# Patient Record
Sex: MALE | Race: WHITE | NOT HISPANIC OR LATINO | Employment: FULL TIME | URBAN - METROPOLITAN AREA
[De-identification: names, ages, dates, MRNs, and addresses within clinical notes are randomized per-mention and may not be internally consistent; named-entity substitution may affect disease eponyms.]

---

## 2019-08-23 ENCOUNTER — CONVERSION ENCOUNTER (OUTPATIENT)
Dept: FAMILY MEDICINE CLINIC | Facility: CLINIC | Age: 39
End: 2019-08-23

## 2019-08-23 ENCOUNTER — OFFICE VISIT CONVERTED (OUTPATIENT)
Dept: FAMILY MEDICINE CLINIC | Facility: CLINIC | Age: 39
End: 2019-08-23
Attending: NURSE PRACTITIONER

## 2019-08-23 ENCOUNTER — HOSPITAL ENCOUNTER (OUTPATIENT)
Dept: LAB | Facility: HOSPITAL | Age: 39
Discharge: HOME | End: 2019-08-23
Attending: NURSE PRACTITIONER

## 2019-08-23 LAB
25(OH)D3 SERPL-MCNC: 24.4 NG/ML (ref 30–100)
ALBUMIN SERPL-MCNC: 4.8 G/DL (ref 3.5–5)
ALBUMIN/GLOB SERPL: 1.7 {RATIO} (ref 1.4–2.6)
ALP SERPL-CCNC: 52 U/L (ref 53–128)
ALT SERPL-CCNC: 44 U/L (ref 10–40)
ANION GAP SERPL CALC-SCNC: 16 MMOL/L (ref 8–19)
AST SERPL-CCNC: 26 U/L (ref 15–50)
BASOPHILS # BLD AUTO: 0.09 10*3/UL (ref 0–0.2)
BASOPHILS NFR BLD AUTO: 0.9 % (ref 0–3)
BILIRUB SERPL-MCNC: 0.54 MG/DL (ref 0.2–1.3)
BUN SERPL-MCNC: 16 MG/DL (ref 5–25)
BUN/CREAT SERPL: 15 {RATIO} (ref 6–20)
CALCIUM SERPL-MCNC: 9.3 MG/DL (ref 8.7–10.4)
CHLORIDE SERPL-SCNC: 99 MMOL/L (ref 99–111)
CHOLEST SERPL-MCNC: 192 MG/DL (ref 107–200)
CHOLEST/HDLC SERPL: 4.8 {RATIO} (ref 3–6)
CONV ABS IMM GRAN: 0.1 10*3/UL (ref 0–0.2)
CONV CO2: 27 MMOL/L (ref 22–32)
CONV IMMATURE GRAN: 1 % (ref 0–1.8)
CONV TOTAL PROTEIN: 7.6 G/DL (ref 6.3–8.2)
CREAT UR-MCNC: 1.08 MG/DL (ref 0.7–1.2)
DEPRECATED RDW RBC AUTO: 38.2 FL (ref 35.1–43.9)
EOSINOPHIL # BLD AUTO: 0.23 10*3/UL (ref 0–0.7)
EOSINOPHIL # BLD AUTO: 2.3 % (ref 0–7)
ERYTHROCYTE [DISTWIDTH] IN BLOOD BY AUTOMATED COUNT: 12 % (ref 11.6–14.4)
FOLATE SERPL-MCNC: 18.1 NG/ML (ref 4.8–20)
GFR SERPLBLD BASED ON 1.73 SQ M-ARVRAT: >60 ML/MIN/{1.73_M2}
GLOBULIN UR ELPH-MCNC: 2.8 G/DL (ref 2–3.5)
GLUCOSE SERPL-MCNC: 103 MG/DL (ref 70–99)
HCT VFR BLD AUTO: 50 % (ref 42–52)
HDLC SERPL-MCNC: 40 MG/DL (ref 40–60)
HGB BLD-MCNC: 16.9 G/DL (ref 14–18)
IRON SATN MFR SERPL: 31 % (ref 20–55)
IRON SERPL-MCNC: 119 UG/DL (ref 70–180)
LDLC SERPL CALC-MCNC: 119 MG/DL (ref 70–100)
LYMPHOCYTES # BLD AUTO: 2.73 10*3/UL (ref 1–5)
LYMPHOCYTES NFR BLD AUTO: 27.6 % (ref 20–45)
MCH RBC QN AUTO: 29.4 PG (ref 27–31)
MCHC RBC AUTO-ENTMCNC: 33.8 G/DL (ref 33–37)
MCV RBC AUTO: 87.1 FL (ref 80–96)
MONOCYTES # BLD AUTO: 0.89 10*3/UL (ref 0.2–1.2)
MONOCYTES NFR BLD AUTO: 9 % (ref 3–10)
NEUTROPHILS # BLD AUTO: 5.85 10*3/UL (ref 2–8)
NEUTROPHILS NFR BLD AUTO: 59.2 % (ref 30–85)
NRBC CBCN: 0 % (ref 0–0.7)
OSMOLALITY SERPL CALC.SUM OF ELEC: 287 MOSM/KG (ref 273–304)
PLATELET # BLD AUTO: 248 10*3/UL (ref 130–400)
PMV BLD AUTO: 9.8 FL (ref 9.4–12.4)
POTASSIUM SERPL-SCNC: 4.1 MMOL/L (ref 3.5–5.3)
RBC # BLD AUTO: 5.74 10*6/UL (ref 4.7–6.1)
SODIUM SERPL-SCNC: 138 MMOL/L (ref 135–147)
T4 FREE SERPL-MCNC: 1 NG/DL (ref 0.9–1.8)
TIBC SERPL-MCNC: 389 UG/DL (ref 245–450)
TRANSFERRIN SERPL-MCNC: 272 MG/DL (ref 215–365)
TRIGL SERPL-MCNC: 163 MG/DL (ref 40–150)
TSH SERPL-ACNC: 1.48 M[IU]/L (ref 0.27–4.2)
VIT B12 SERPL-MCNC: 380 PG/ML (ref 211–911)
VLDLC SERPL-MCNC: 33 MG/DL (ref 5–37)
WBC # BLD AUTO: 9.89 10*3/UL (ref 4.8–10.8)

## 2019-09-20 ENCOUNTER — OFFICE VISIT CONVERTED (OUTPATIENT)
Dept: FAMILY MEDICINE CLINIC | Facility: CLINIC | Age: 39
End: 2019-09-20
Attending: NURSE PRACTITIONER

## 2019-10-10 ENCOUNTER — HOSPITAL ENCOUNTER (OUTPATIENT)
Dept: SLEEP MEDICINE | Facility: HOSPITAL | Age: 39
Discharge: HOME | End: 2019-10-10
Attending: PSYCHIATRY & NEUROLOGY

## 2019-10-23 ENCOUNTER — HOSPITAL ENCOUNTER (OUTPATIENT)
Dept: SLEEP MEDICINE | Facility: HOSPITAL | Age: 39
Discharge: HOME | End: 2019-10-23
Attending: PSYCHIATRY & NEUROLOGY

## 2019-12-18 ENCOUNTER — HOSPITAL ENCOUNTER (OUTPATIENT)
Dept: SLEEP MEDICINE | Facility: HOSPITAL | Age: 39
Discharge: HOME | End: 2019-12-18
Attending: PSYCHIATRY & NEUROLOGY

## 2019-12-20 ENCOUNTER — OFFICE VISIT CONVERTED (OUTPATIENT)
Dept: FAMILY MEDICINE CLINIC | Facility: CLINIC | Age: 39
End: 2019-12-20
Attending: NURSE PRACTITIONER

## 2020-01-31 ENCOUNTER — OFFICE VISIT CONVERTED (OUTPATIENT)
Dept: FAMILY MEDICINE CLINIC | Facility: CLINIC | Age: 40
End: 2020-01-31
Attending: NURSE PRACTITIONER

## 2020-02-28 ENCOUNTER — OFFICE VISIT CONVERTED (OUTPATIENT)
Dept: FAMILY MEDICINE CLINIC | Facility: CLINIC | Age: 40
End: 2020-02-28
Attending: NURSE PRACTITIONER

## 2020-07-31 ENCOUNTER — TELEMEDICINE CONVERTED (OUTPATIENT)
Dept: FAMILY MEDICINE CLINIC | Facility: CLINIC | Age: 40
End: 2020-07-31
Attending: NURSE PRACTITIONER

## 2020-09-14 ENCOUNTER — OFFICE VISIT CONVERTED (OUTPATIENT)
Dept: FAMILY MEDICINE CLINIC | Facility: CLINIC | Age: 40
End: 2020-09-14
Attending: PHYSICIAN ASSISTANT

## 2020-09-14 ENCOUNTER — CONVERSION ENCOUNTER (OUTPATIENT)
Dept: FAMILY MEDICINE CLINIC | Facility: CLINIC | Age: 40
End: 2020-09-14

## 2020-12-28 ENCOUNTER — HOSPITAL ENCOUNTER (OUTPATIENT)
Dept: URGENT CARE | Facility: CLINIC | Age: 40
Discharge: HOME | End: 2020-12-28
Attending: EMERGENCY MEDICINE

## 2021-05-13 NOTE — PROGRESS NOTES
Progress Note      Patient Name: Akash North   Patient ID: [id number]   Sex: Male   YOB: 1980    Primary Care Provider: Denia OLMOS   Referring Provider: Denia OLMOS    Visit Date: July 31, 2020    Provider: AMARILIS Rhodes   Location: [facility]   Location Address: [address] [place] [address], [address]  [place], [place]  [id number]   Location Phone: [phone]          Chief Complaint  · 3 month f/u  · ADD      History Of Present Illness  Akash North is a 40 year old /White male who presents for evaluation and treatment of:      the patient presents on facetime for med check and med refill he has h/o ADD he was having some depression and was started on Wellbutrin he states that is better but he is still having some agitation especially with his older children       Past Medical History  Disease Name Date Onset Notes   Pilonidal cyst --  --    Snoring --  --          Past Surgical History  Procedure Name Date Notes   Pilonidal Cyst Excision 2014 --          Medication List  Name Date Started Instructions   vitamin b12 gummies 1000 mcg  takes 2 qd   Vyvanse 40 mg oral capsule 07/30/2020 take 1 capsule (40 mg) by oral route once daily in the morning for 30 days   Wellbutrin  mg oral tablet extended release 24 hr 04/14/2020 take 1 tablet (300 mg) by oral route once daily for 90 days         Allergy List  Allergen Name Date Reaction Notes   NO KNOWN DRUG ALLERGIES --  --  --        Allergies Reconciled  Family Medical History  Disease Name Relative/Age Notes   Heart Disease Aunt/  Grandfather (maternal)/   --    Cancer, Unspecified Grandmother (maternal)/   --          Social History  Finding Status Start/Stop Quantity Notes   Alcohol Current some day 18/0 1-2 drinks/week drinks weekly; liquor   Caffeine Current every day 0/0 --  drinks regularly; coffee and soft drinks; 1-2 times per day    --  --/-- --  --    Tobacco Former 16/36 --  --           Immunizations  NameDate Admin Mfg Trade Name Lot Number Route Inj VIS Given VIS Publication   Temoapwif99/22/2019 [facility] Fluzone Quadrivalent [id number] IM RD 10/22/2019    Comments: tolerated well   Popvqwmhx26/01/2018 NE Not Entered  NE NE     Comments: Pt reported   Tdap08/23/2019 [facility] BOOSTRIX [id number] IM RD 08/23/2019    Comments: tolerated well   Tdap08/23/2019 [facility] BOOSTRIX [id number] IM RD 08/23/2019    Comments: tolerated well   Tdap08/23/2019 [facility] BOOSTRIX [id number] IM RD 08/23/2019    Comments: tolerated well         Review of Systems  · Constitutional  o Denies  o : fever, weight gain, weight loss, malaise/fatigue  · Eyes  o Denies  o : diplopia, recent changes, blurred vision, redness of eye, eye pain, discharge from eye  · HENT  o Denies  o : sore throat, hearing changes, tinnitus, nose bleeding, sinus pain, nasal discharge, ear pain  · Breasts  o Denies  o : lumps, tenderness, swelling, nipple discharge  · Cardiovascular  o Denies  o : palpitation, chest pain, claudication, pedal edema  · Respiratory  o Denies  o : shortness of breath, BYNUM, PND/Orthopnea, hemoptysis, dry cough, productive cough  · Gastrointestinal  o Denies  o : nausea, vomiting, reflux, diarrhea, constipation, abdominal pain, blood in stools  · Genitourinary  o Denies  o : frequency, urgency, dysuria, vaginal discharge, penile discharge, incontinence, nocturia, irregular menses, hot flashes  · Neurologic  o Denies  o : unsteady gait, weakness, dizziness, H/A  · Musculoskeletal  o Denies  o : myalgias, joint pain, joint swelling  · Endocrine  o Denies  o : heat intolerance, cold intolerance, polyuria, polydipsia  · Psychiatric  o Denies  o : suicidal ideation, homicidal ideation, mood changes, hallucinations, memory  · Heme-Lymph  o Denies  o : easy bleeding, easy bruising, edema, lymph node enlargement or tenderness  · Allergic-Immunologic  o Denies  o : bees, frequent illnesses, seasonal allergies      Physical  Examination  · Constitutional  o Appearance  o : well-nourished, well developed, alert, in no acute distress  · Ears, Nose, Mouth and Throat  o Ears  o :   § External Ears  § : appearance within normal limits, no lesions present  § Otoscopic Examination  § : tympanic membrane appearance within normal limits bilaterally without perforations, mobility normal  o Nose  o :   § External Nose  § : normal stucture noted.  § Intranasal Exam  § : no swelling, reddness, turbs normal rachid.  o Oral Cavity  o :   § Oral Mucosa  § : oral mucosa normal without pallor or cyanosis  § Lips  § : lip appearance normal  § Teeth  § : normal dentition for age  § Gums  § : gums pink, non-swollen, no bleeding present  § Tongue  § : tongue appearance normal  § Palate  § : hard palate normal, soft palate appearance normal  o Throat  o :   § Oropharynx  § : no inflammation or lesions present, tonsils within normal limits  · Respiratory  o Respiratory Effort  o : breathing unlabored  o Auscultation of Lungs  o : normal breath sounds throughout  · Cardiovascular  o Heart  o :   § Auscultation of Heart  § : regular rate and rhythm, no murmurs, gallops or rubs  § Palpation of Heart  § : normal apical impulse, no cardiac thrill present  o Peripheral Vascular System  o :   § Carotid Arteries  § : normal pulses bilaterally, no bruits present  § Pedal Pulses  § : pulses 2 bilaterally  § Extremities  § : no cyanosis, clubbing or edema; less than 2 second refill noted  · Gastrointestinal  o Abdominal Examination  o : abdomen nontender to palpation, tone normal without rigidity or guarding, no masses present, abdominal contour scaphoid  o Liver and spleen  o : no hepatomegaly present, liver nontender to palpation, spleen not palpable  · Musculoskeletal  o Right Upper Extremity  o :   § Inspection/Palpation  § : no tenderness to palpation  § Range of Motion  § : range of motion normal, no joint crepitus or pain with motion present  o Left Upper  Extremity  o :   § Inspection/Palpation  § : no tenderness to palpation  § Range of Motion  § : range of motion normal, no joint crepitus present, no pain with joint motion  o Right Lower Extremity  o :   § Inspection/Palpation  § : no joint or limb tenderness to palpation  § Range of Motion  § : range of motion normal, no joint crepitations present, no pain on motion  o Left Lower Extremity  o :   § Inspection/Palpation  § : no joint or limb tenderness to palpation  § Range of Motion  § : range of motion normal, no joint crepitations present, no pain on motion  · Skin and Subcutaneous Tissue  o General Inspection  o : no rashes or lesions present, no areas of discoloration  · Neurologic  o Mental Status Examination  o :   § Orientation  § : grossly oriented to person, place and time  o Cranial Nerves  o : cranial nerves intact and symmetric throughout  · Psychiatric  o Mood and Affect  o : mood normal, affect appropriate, denies any SI/HI          Assessment  · Other long term (current) drug therapy     V58.69/Z79.899  · ADD (attention deficit disorder)     314.00/F98.8      Plan  · Orders  o CODY Report (KASPR) - - 07/31/2020  o ACO-39: Current medications updated and reviewed () - - 07/31/2020  · Medications  o Medications have been Reconciled  o Transition of Care or Provider Policy  · Instructions  o Patient was educated/instructed on their diagnosis, treatment and medications prior to discharge from the clinic today.            Electronically Signed by: admin admin, OT -Author on August 27, 2020 09:26:16 PM

## 2021-05-13 NOTE — PROGRESS NOTES
Progress Note      Patient Name: Akash North   Patient ID: [id number]   Sex: Male   YOB: 1980    Primary Care Provider: Elly BOWER   Referring Provider: Elly BOWER    Visit Date: September 14, 2020    Provider: SATHISH Arenas   Location: [facility]   Location Address: [address] [place] [address], [address]  [place], [place]  [id number]   Location Phone: [phone]          Chief Complaint  · follow up medication refill       History Of Present Illness  Akash North is a 40 year old /White male who presents for evaluation and treatment of:      Patient is here for follow up/medication refills.    ADD: Patient is currently on Vyvanse & Wellbutrin & states he is doing ok but feels like Vyvanse is needing an increase. Patient has been on medication for about 1 year; states he has never seen psychologist for formal testing and diagnosis. Patient appears agitated during appt today on his phone when MA went to bring him back to the room and continued on phone during exam. Last dose of Vyvanse was about 10 days ago.       Past Medical History  Disease Name Date Onset Notes   Pilonidal cyst --  --    Snoring --  --          Past Surgical History  Procedure Name Date Notes   Pilonidal Cyst Excision 2014 --          Medication List  Name Date Started Instructions   vitamin b12 gummies 1000 mcg  takes 2 qd   Vyvanse 40 mg oral capsule 07/30/2020 take 1 capsule (40 mg) by oral route once daily in the morning for 30 days   Wellbutrin  mg oral tablet extended release 24 hr 04/14/2020 take 1 tablet (300 mg) by oral route once daily for 90 days         Allergy List  Allergen Name Date Reaction Notes   NO KNOWN DRUG ALLERGIES --  --  --        Allergies Reconciled  Family Medical History  Disease Name Relative/Age Notes   Heart Disease Aunt/  Grandfather (maternal)/   --    Cancer, Unspecified Grandmother (maternal)/   --          Social  "History  Finding Status Start/Stop Quantity Notes   Alcohol Current some day 18/0 1-2 drinks/week drinks weekly; liquor   Caffeine Current every day 0/0 --  drinks regularly; coffee and soft drinks; 1-2 times per day    --  --/-- --  --    Tobacco Former 16/36 --  --          Immunizations  NameDate Admin Mfg Trade Name Lot Number Route Inj VIS Given VIS Publication   Otzkfbhqn19/22/2019 [facility] Fluzone Quadrivalent [id number] IM RD 10/22/2019    Comments: tolerated well   Jsqsxvzky00/01/2018 NE Not Entered  NE NE     Comments: Pt reported   Tdap08/23/2019 [facility] BOOSTRIX [id number] IM RD 08/23/2019    Comments: tolerated well   Tdap08/23/2019 [facility] BOOST[employer]X [id number] IM RD 08/23/2019    Comments: tolerated well   Tdap08/23/2019 [facility] [employer] [id number] IM RD 08/23/2019    Comments: tolerated well         Review of Systems  · Constitutional  o Denies  o : fever, fatigue, weight loss, weight gain  · Cardiovascular  o Denies  o : lower extremity edema, claudication, chest pressure, palpitations  · Respiratory  o Denies  o : shortness of breath, wheezing, cough, hemoptysis, dyspnea on exertion  · Gastrointestinal  o Denies  o : nausea, vomiting, diarrhea, constipation, abdominal pain      Vitals  Date Time BP Position Site L\R Cuff Size HR RR TEMP (F) WT  HT  BMI kg/m2 BSA m2 O2 Sat        02/28/2020 10:39 /70 Sitting    90 - R   241lbs 16oz 5'  11\" 33.75 2.34 96 %    09/14/2020 05:39 /98 Sitting    100 - R  98.2 246lbs 0oz 6'   33.36 2.38 97 %          Physical Examination  · Constitutional  o Appearance  o : well developed, well-nourished, no acute distress  · Head and Face  o Head  o : normocephalic, atraumatic  · Neck  o Inspection/Palpation  o : normal appearance, no masses or tenderness, trachea midline  o Thyroid  o : gland size normal, nontender, no nodules or masses present on palpation  · Respiratory  o Respiratory Effort  o : breathing unlabored  o Inspection of Chest  o : chest rise symmetric " bilaterally  o Auscultation of Lungs  o : clear to auscultation bilaterally throughout inspiration and expiration  · Cardiovascular  o Heart  o :   § Auscultation of Heart  § : regular rate and rhythm, no murmurs, gallops or rubs  o Peripheral Vascular System  o :   § Extremities  § : no edema  · Lymphatic  o Neck  o : no cervical lymphadenopathy, no supraclavicular lymphadenopathy  · Psychiatric  o Mood and Affect  o : mood normal, affect appropriate          Results  · In-Office Procedures  o Lab procedure  § IOP - Urine Drug Screen In-House [facility] ([id number])   § Amphetamines Ur Ql: Negative   § Barbiturates Ur Ql: Negative   § Buprenorphine+Nor Ur Ql Scn: Negative   § Benzodiaz Ur Ql: Negative   § Cocaine Ur Ql: Negative   § Methadone Ur Ql: Negative   § Methamphet Ur Ql: Negative   § MDMA Ur Ql Scn: Negative   § Opiates Ur Ql: Negative   § Oxycodone Ur Ql: Negative   § PCP Ur Ql: Negative   § THC Ur Ql: Negative   § Temp in Range?: Within/Acceptable   § Control Seen?: Yes       Assessment  · Screening for depression     V79.0/Z13.89  · Depression     311/F32.9  · ADD (attention deficit disorder)     314.00/F98.8       Discussed with patient that I would task Dr. Clark for a refill this month and would set up a referral for psych for formal diagnosis and further medication management. Patient was agitated and left but seemed to be in agreement.     Problems Reconciled  Plan  · Orders  o ACO-18: Negative screen for clinical depression using a standardized tool () - V79.0/Z13.89 - 09/14/2020  o CODY Report (KASPR) - - 09/14/2020  o ACO-39: Current medications updated and reviewed () - - 09/14/2020  o PSYCHIATRY CONSULTATION (PSYCH) - 314.00/F98.8 - 09/14/2020   continued medication management.  · Medications  o Medications have been Reconciled  o Transition of Care or Provider Policy  · Instructions  o Depression Screen completed and scanned into the EMR under the designated folder within the patient's  documents.  o Today's PHQ-9 result is 3  o Patient was educated/instructed on their diagnosis, treatment and medications prior to discharge from the clinic today.  o Call the office with any concerns or questions.  o Electronically Identified Patient Education Materials Provided Electronically  · Disposition  o Follow Up PRN.  · Associate Tasks  o Task ID [id number] ''''Provider to Provider ONLY Controlled Substance Request: Vyvanse refill            Electronically Signed by: SATHISH Arenas -Author on September 14, 2020 05:48:21 PM

## 2021-05-14 VITALS
DIASTOLIC BLOOD PRESSURE: 98 MMHG | WEIGHT: 246 LBS | OXYGEN SATURATION: 97 % | HEIGHT: 72 IN | SYSTOLIC BLOOD PRESSURE: 163 MMHG | HEART RATE: 100 BPM | BODY MASS INDEX: 33.32 KG/M2 | TEMPERATURE: 98.2 F

## 2021-05-15 VITALS
WEIGHT: 233 LBS | BODY MASS INDEX: 32.62 KG/M2 | DIASTOLIC BLOOD PRESSURE: 80 MMHG | SYSTOLIC BLOOD PRESSURE: 124 MMHG | HEART RATE: 100 BPM | HEIGHT: 71 IN | OXYGEN SATURATION: 97 %

## 2021-05-15 VITALS
OXYGEN SATURATION: 98 % | SYSTOLIC BLOOD PRESSURE: 140 MMHG | HEIGHT: 71 IN | DIASTOLIC BLOOD PRESSURE: 90 MMHG | WEIGHT: 239 LBS | BODY MASS INDEX: 33.46 KG/M2

## 2021-05-15 VITALS
DIASTOLIC BLOOD PRESSURE: 80 MMHG | OXYGEN SATURATION: 98 % | HEIGHT: 71 IN | WEIGHT: 234 LBS | SYSTOLIC BLOOD PRESSURE: 130 MMHG | BODY MASS INDEX: 32.76 KG/M2 | HEART RATE: 91 BPM

## 2021-05-15 VITALS
WEIGHT: 245.5 LBS | HEART RATE: 86 BPM | HEIGHT: 71 IN | OXYGEN SATURATION: 95 % | SYSTOLIC BLOOD PRESSURE: 132 MMHG | BODY MASS INDEX: 34.37 KG/M2 | DIASTOLIC BLOOD PRESSURE: 89 MMHG

## 2021-05-15 VITALS
WEIGHT: 242 LBS | HEIGHT: 71 IN | DIASTOLIC BLOOD PRESSURE: 70 MMHG | BODY MASS INDEX: 33.88 KG/M2 | SYSTOLIC BLOOD PRESSURE: 120 MMHG | HEART RATE: 90 BPM | OXYGEN SATURATION: 96 %

## 2021-07-23 RX ORDER — UBIDECARENONE 75 MG
CAPSULE ORAL
COMMUNITY
End: 2022-05-26

## 2021-07-26 ENCOUNTER — OFFICE VISIT (OUTPATIENT)
Dept: SURGERY | Facility: CLINIC | Age: 41
End: 2021-07-26

## 2021-07-26 VITALS — HEIGHT: 72 IN | BODY MASS INDEX: 34.67 KG/M2 | RESPIRATION RATE: 14 BRPM | WEIGHT: 256 LBS

## 2021-07-26 DIAGNOSIS — L98.8 PILONIDAL DISEASE: Primary | ICD-10-CM

## 2021-07-26 PROCEDURE — 99202 OFFICE O/P NEW SF 15 MIN: CPT | Performed by: SURGERY

## 2021-07-26 RX ORDER — CLINDAMYCIN PHOSPHATE 10 MG/1
SWAB TOPICAL
COMMUNITY
Start: 2021-04-30

## 2021-07-26 RX ORDER — FLUOXETINE HYDROCHLORIDE 20 MG/1
20 CAPSULE ORAL DAILY
COMMUNITY
Start: 2021-07-22

## 2021-07-26 RX ORDER — LISDEXAMFETAMINE DIMESYLATE 50 MG
50 CAPSULE ORAL DAILY
COMMUNITY
Start: 2021-07-24

## 2021-07-26 NOTE — PROGRESS NOTES
"Chief Complaint:  Cyst (pilonidal cyst )         History of Present Illness  Akash North is a 41 y.o. male referred by SATHISH Mendoza chronic pilonidal issues.  Patient says he has had problems on and off near the top of his midline gluteal cleft for many years.  He has had incision and drainage performed twice.  He has some mild pain at the area now.  He recently was seen [facility] by Jennifer Leger and was prescribed topical clindamycin.  No fevers.  No drainage from the area at this time.  Patient does not smoke cigarettes but he does do vaping.    Allergies: Patient has no known allergies.    Current Outpatient Medications:   •  Clindacin-P 1 % swab, APPLY TOPCIALLY TWICE DAILY, Disp: , Rfl:   •  FLUoxetine (PROzac) 20 MG capsule, Take 20 mg by mouth Daily., Disp: , Rfl:   •  Vyvanse 50 MG capsule, Take 50 mg by mouth Daily, Disp: , Rfl:   •  vitamin B-12 (CYANOCOBALAMIN) 100 MCG tablet, vitamin b12 gummies 1000 mcg takes 2 qd   Active, Disp: , Rfl:     Past Medical History:   • Pilonidal cyst   • Snoring     Past Surgical History:   • PILONIDAL CYST / SINUS EXCISION     Family History:   Family History   Problem Relation Age of Onset   • Cancer Maternal Grandmother    • Heart disease Maternal Grandfather    • Heart disease Other    • Diabetes Other       Social History:  Social History     Tobacco Use   • Smoking status: Former Smoker   • Smokeless tobacco: Never Used   • Tobacco comment: 16/36   Substance Use Topics   • Alcohol use: Yes     Comment: drinks weekly; liquor       Objective     Vital Signs:  Resp 14   Ht 182.9 cm (72\")   Wt 116 kg (256 lb)   BMI 34.72 kg/m²   • Constitutional: healthy appearing, alert, no acute distress, reliable historian, here alone  • HENT:  NCAT, no visible deformities or lesions, wearing glasses  • Eyes:  sclerae clear, conjunctivae clear, EOMI  • Neck:  normal appearance, no masses, trachea midline  • Respiratory:  breathing not labored, " respiratory effort appears normal  • Cardiovascular:  heart regular rate and rhythm  • Abdomen:  soft, nontender, nondistended    • Skin and subcutaneous tissue: At the top of the midline gluteal cleft just to the right side, there is an area about 5 cm in diameter that is indurated and has an appearance and exam consistent with scar tissue.  There is mild redness of the area.  No drainage.  There is a moderate amount of hair present on the buttock skin.  In the midline of the gluteal cleft there are at least 4 5 moderate sized skin pits.    • Neurologic:  no obvious motor or sensory deficits, normal gait, able to stand without difficulty, cerebellar function without any obvious abnormalities, alert & oriented x 3, speech clear  • Psychiatric:  judgment and insight intact, mood normal, affect appropriate, cooperative    Assessment:  Pilonidal disease    Plan:  No acute issue present on exam that needs addressed today.  Discussed the diagnosis of pilonidal disease.  Also discussed treatment option of cleft lift procedure.  I also recommended the patient use the internet to review information about pilonidal disease and also to watch videos about the procedure I mentioned today.  Patient will then schedule an appointment to see me again if the patient wants to proceed with surgical intervention.    Magdaleno Jovel MD  07/26/2021    Electronically signed by Magdaleno Jovel MD, 07/26/21, 10:44 AM EDT.

## 2022-05-09 ENCOUNTER — APPOINTMENT (OUTPATIENT)
Dept: GENERAL RADIOLOGY | Facility: HOSPITAL | Age: 42
End: 2022-05-09

## 2022-05-09 ENCOUNTER — HOSPITAL ENCOUNTER (EMERGENCY)
Facility: HOSPITAL | Age: 42
Discharge: HOME | End: 2022-05-09
Attending: EMERGENCY MEDICINE | Admitting: EMERGENCY MEDICINE

## 2022-05-09 VITALS
WEIGHT: 251.1 LBS | HEIGHT: 72 IN | SYSTOLIC BLOOD PRESSURE: 127 MMHG | BODY MASS INDEX: 34.01 KG/M2 | HEART RATE: 96 BPM | RESPIRATION RATE: 18 BRPM | TEMPERATURE: 98 F | DIASTOLIC BLOOD PRESSURE: 93 MMHG | OXYGEN SATURATION: 98 %

## 2022-05-09 DIAGNOSIS — M25.561 PAIN AND SWELLING OF RIGHT KNEE: Primary | ICD-10-CM

## 2022-05-09 DIAGNOSIS — S80.211A ABRASION OF RIGHT KNEE, INITIAL ENCOUNTER: ICD-10-CM

## 2022-05-09 DIAGNOSIS — M25.461 PAIN AND SWELLING OF RIGHT KNEE: Primary | ICD-10-CM

## 2022-05-09 PROCEDURE — 99282 EMERGENCY DEPT VISIT SF MDM: CPT

## 2022-05-09 PROCEDURE — 25010000002 DEXAMETHASONE PER 1 MG

## 2022-05-09 PROCEDURE — 25010000002 KETOROLAC TROMETHAMINE PER 15 MG

## 2022-05-09 PROCEDURE — 96372 THER/PROPH/DIAG INJ SC/IM: CPT

## 2022-05-09 PROCEDURE — 73562 X-RAY EXAM OF KNEE 3: CPT

## 2022-05-09 RX ORDER — PREDNISONE 20 MG/1
TABLET ORAL
Qty: 18 TABLET | Refills: 0 | Status: SHIPPED | OUTPATIENT
Start: 2022-05-09 | End: 2022-05-18

## 2022-05-09 RX ORDER — KETOROLAC TROMETHAMINE 10 MG/1
10 TABLET, FILM COATED ORAL EVERY 6 HOURS PRN
Qty: 20 TABLET | Refills: 0 | Status: SHIPPED | OUTPATIENT
Start: 2022-05-09

## 2022-05-09 RX ORDER — LISINOPRIL AND HYDROCHLOROTHIAZIDE 12.5; 1 MG/1; MG/1
12.5 TABLET ORAL DAILY
COMMUNITY

## 2022-05-09 RX ORDER — KETOROLAC TROMETHAMINE 30 MG/ML
60 INJECTION, SOLUTION INTRAMUSCULAR; INTRAVENOUS ONCE
Status: COMPLETED | OUTPATIENT
Start: 2022-05-09 | End: 2022-05-09

## 2022-05-09 RX ORDER — DEXAMETHASONE SODIUM PHOSPHATE 10 MG/ML
10 INJECTION INTRAMUSCULAR; INTRAVENOUS ONCE
Status: COMPLETED | OUTPATIENT
Start: 2022-05-09 | End: 2022-05-09

## 2022-05-09 RX ADMIN — DEXAMETHASONE SODIUM PHOSPHATE 10 MG: 10 INJECTION INTRAMUSCULAR; INTRAVENOUS at 07:40

## 2022-05-09 RX ADMIN — KETOROLAC TROMETHAMINE 60 MG: 60 INJECTION, SOLUTION INTRAMUSCULAR at 07:37

## 2022-05-09 NOTE — ED PROVIDER NOTES
Subjective   Patient is a 42-year-old male that presents to the emergency department today, via POV, complaining of right knee pain.  Patient states that Saturday he was doing some yard work when he fell and hit a rock.  He states since then the pain has increased as well as edema.  He describes his pain at rest as throbbing and reports that it increases to a 9 on a scale of 0-10 when he attempts to move it.  He has tried compression to the knee to help with discomfort however it has not helped.      History provided by:  Patient   used: No        Review of Systems   Constitutional: Negative for chills and fever.   HENT: Negative for congestion, ear pain and sore throat.    Eyes: Negative for pain.   Respiratory: Negative for cough, chest tightness and shortness of breath.    Cardiovascular: Negative for chest pain.   Gastrointestinal: Negative for abdominal pain, diarrhea, nausea and vomiting.   Genitourinary: Negative for flank pain and hematuria.   Musculoskeletal: Positive for joint swelling.        Right knee pain and tenderness   Skin: Negative for pallor.   Neurological: Negative for seizures and headaches.   All other systems reviewed and are negative.      Past Medical History:   Diagnosis Date   • Depression    • Hypertension    • Pilonidal cyst    • Snoring        No Known Allergies    Past Surgical History:   Procedure Laterality Date   • PILONIDAL CYST / SINUS EXCISION  2014       Family History   Problem Relation Age of Onset   • Cancer Maternal Grandmother    • Heart disease Maternal Grandfather    • Heart disease Other    • Diabetes Other        Social History     Socioeconomic History   • Marital status:    Tobacco Use   • Smoking status: Former Smoker   • Smokeless tobacco: Never Used   • Tobacco comment: 16/36   Vaping Use   • Vaping Use: Every day   • Substances: Nicotine   • Devices: Pre-filled or refillable cartridge   Substance and Sexual Activity   • Alcohol use:  Yes     Comment: drinks weekly; liquor   • Drug use: Never   • Sexual activity: Defer           Objective   Physical Exam  Vitals and nursing note reviewed.   Constitutional:       General: He is not in acute distress.     Appearance: Normal appearance. He is not ill-appearing or toxic-appearing.   HENT:      Head: Normocephalic and atraumatic.      Mouth/Throat:      Mouth: Mucous membranes are moist.   Eyes:      General: No scleral icterus.  Cardiovascular:      Rate and Rhythm: Normal rate and regular rhythm.      Pulses: Normal pulses.      Heart sounds: Normal heart sounds.   Pulmonary:      Effort: Pulmonary effort is normal. No respiratory distress.      Breath sounds: Normal breath sounds.   Abdominal:      General: Abdomen is flat.      Palpations: Abdomen is soft.      Tenderness: There is no abdominal tenderness.   Musculoskeletal:         General: Swelling and tenderness present. No deformity. Normal range of motion.      Cervical back: Normal range of motion and neck supple.        Legs:       Comments: Right knee is edematous in nature, tender on exam, with a abrasion located in center of knee.  No gross deformity, no obvious dislocation, PMS intact, and cap refill within normal limits.  Range of motion slightly limited.There is no warmth or redness.    Skin:     General: Skin is warm and dry.      Coloration: Skin is not jaundiced or pale.      Findings: No bruising, erythema, lesion or rash.   Neurological:      Mental Status: He is alert and oriented to person, place, and time. Mental status is at baseline.         Procedures           ED Course  ED Course as of 05/09/22 0741   Mon May 09, 2022   0719 Patient does report he has an established relationship with the pediatric MD Dr. Jones. [MS]      ED Course User Index  [MS] Mireille Fernández, APRN                                                 STEFAN  Number of Diagnoses or Management Options  Abrasion of right knee, initial encounter: new and  does not require workup  Pain and swelling of right knee: new and does not require workup  Diagnosis management comments: I have spoke with the patient and I have explained the patient´s condition, diagnoses and treatment plan based on the information available to me at this time. I have answered all questions and addressed any concerns. The patient has a good understanding of the patient´s diagnosis, condition, and treatment plan as can be expected at this point. The vital signs have been stable. The patient´s condition is stable and appropriate for discharge from the emergency department.      The patient will pursue further outpatient evaluation with the primary care physician or other designated or consulting physician as outlined in the discharge instructions. They are agreeable to this plan of care and follow-up instructions have been explained in detail. The patient has received these instructions in written format and have expressed an understanding of the discharge instructions. The patient is aware that any significant change in condition or worsening of symptoms should prompt an immediate return to this or the closest emergency department or call to 911.         Amount and/or Complexity of Data Reviewed  Tests in the radiology section of CPT®: reviewed and ordered  Review and summarize past medical records: yes (I have personally reviewed patient's previous medical encounters.  )    Risk of Complications, Morbidity, and/or Mortality  Presenting problems: low  Diagnostic procedures: low  Management options: low    Patient Progress  Patient progress: stable      Final diagnoses:   Pain and swelling of right knee   Abrasion of right knee, initial encounter       ED Disposition  ED Disposition     ED Disposition   Discharge    Condition   Stable    Comment   --             Mitchel Jones MD  [address] [facility]  [place] [address]  [phone]      Call today to schedule a follow-up appointment with your  ortho doctor Dr. Jones.         Medication List      New Prescriptions    ketorolac 10 MG tablet  Commonly known as: TORADOL  Take 1 tablet by mouth Every 6 (Six) Hours As Needed for Moderate Pain .     predniSONE 20 MG tablet  Commonly known as: DELTASONE  Take 1 tablet by mouth 3 (Three) Times a Day With Meals for 3 days, THEN 1 tablet 2 (Two) Times a Day for 3 days, THEN 1 tablet Daily for 3 days.  Start taking on: May 9, 2022           Where to Get Your Medications      These medications were sent to [id number] - [place], [place] - [address]  AT [facility] [address] - [phone]  - [phone]   [address] [facility] [address]    Phone: [phone]   · ketorolac 10 MG tablet  · predniSONE 20 MG tablet          Mireille Fernández, AMARILIS  05/09/22 0733       Mireille Fernández, AMARILIS  05/09/22 0700

## 2022-05-12 ENCOUNTER — OFFICE VISIT (OUTPATIENT)
Dept: ORTHOPEDIC SURGERY | Facility: CLINIC | Age: 42
End: 2022-05-12

## 2022-05-12 VITALS — HEIGHT: 72 IN | WEIGHT: 253.2 LBS | BODY MASS INDEX: 34.29 KG/M2 | HEART RATE: 102 BPM | OXYGEN SATURATION: 95 %

## 2022-05-12 DIAGNOSIS — S89.91XA RIGHT KNEE INJURY, INITIAL ENCOUNTER: Primary | ICD-10-CM

## 2022-05-12 PROCEDURE — 99203 OFFICE O/P NEW LOW 30 MIN: CPT | Performed by: ORTHOPAEDIC SURGERY

## 2022-05-12 NOTE — PROGRESS NOTES
"Chief Complaint  Pain and Initial Evaluation of the Right Knee     Subjective      Akash North presents to [facility] for evaluation of the right knee. The patient reports he fell on Saturday 5/7/22 and landed on his knee. He reports he had instant swelling and pain to the knee. He has had stiffness to the knee. He has rested the knee without relief. He was seen and evaluated with x-rays that were negative. He has no other complaints.     No Known Allergies     Social History     Socioeconomic History   • Marital status:    Tobacco Use   • Smoking status: Former Smoker   • Smokeless tobacco: Never Used   • Tobacco comment: 16/36   Vaping Use   • Vaping Use: Every day   • Substances: Nicotine   • Devices: Pre-filled or refillable cartridge   Substance and Sexual Activity   • Alcohol use: Yes     Comment: drinks weekly; liquor   • Drug use: Never   • Sexual activity: Defer        Review of Systems     Objective   Vital Signs:   Pulse 102   Ht 182.9 cm (72\")   Wt 115 kg (253 lb 3.2 oz)   SpO2 95%   BMI 34.34 kg/m²       Physical Exam  Constitutional:       Appearance: Normal appearance. The patient is well-developed and normal weight.   HENT:      Head: Normocephalic.      Right Ear: Hearing and external ear normal.      Left Ear: Hearing and external ear normal.      Nose: Nose normal.   Eyes:      Conjunctiva/sclera: Conjunctivae normal.   Cardiovascular:      Rate and Rhythm: Normal rate.   Pulmonary:      Effort: Pulmonary effort is normal.      Breath sounds: No wheezing or rales.   Abdominal:      Palpations: Abdomen is soft.      Tenderness: There is no abdominal tenderness.   Musculoskeletal:      Cervical back: Normal range of motion.   Skin:     Findings: No rash.   Neurological:      Mental Status: The patient is alert and oriented to person, place, and time.   Psychiatric:         Mood and Affect: Mood and affect normal.         Judgment: Judgment normal. "       Ortho Exam      Right knee- abrasion to the anterior knee with mild bruising and swelling. Knee Extensor Mechanism intact. ROM 0- 115 degrees. Pain with flexion. 5/5 extension strength. Stable to varus/valgus stress. Stable to anterior/posterior drawer. Positive EHL, FHL, GS and TA. Sensation intact to all 5 nerves of the foot. Positive pulses.     Procedures      Imaging Results (Most Recent)     None           Result Review :       XR Knee 3 View Right    Result Date: 5/9/2022  Narrative: PROCEDURE: XR KNEE 3 VW RIGHT  COMPARISON: None  INDICATIONS: RIGHT ANTERIOR KNEE ABRASION AFTER FALL INJURY YESTERDAY  FINDINGS:  BONES: Normal.  No significant arthropathy or acute abnormality.  SOFT TISSUES: Negative.  No visible soft tissue swelling.  EFFUSION: None visible.  OTHER: Negative.       Impression:  Normal examination.       MAGDALENA LANDA MD       Electronically Signed and Approved By: MAGDALENA LANDA MD on 5/09/2022 at 7:00                      Assessment and Plan     Diagnoses and all orders for this visit:    1. Right knee injury, initial encounter (Primary)        Discussed the treatment plan with the patient. I reviewed the x-rays that were obtained. I believe he just has a contusion to the right knee.  Plan to continue to rest the knee and observe it. May consider MRI of the right knee if symptoms worsen. Plan for OTC medication for pain and swelling.     Call or return if worsening symptoms.    Follow Up     PRN      Patient was given instructions and counseling regarding his condition or for health maintenance advice. Please see specific information pulled into the AVS if appropriate.     Scribed for Edy Olivia MD by Heather Salas.  05/12/22   09:07 EDT    I have personally performed the services described in this document as scribed by the above individual and it is both accurate and complete. Edy Olivia MD 05/12/22

## 2022-05-26 ENCOUNTER — OFFICE VISIT (OUTPATIENT)
Dept: UROLOGY | Facility: CLINIC | Age: 42
End: 2022-05-26

## 2022-05-26 VITALS
WEIGHT: 248 LBS | HEIGHT: 72 IN | TEMPERATURE: 98.4 F | HEART RATE: 101 BPM | BODY MASS INDEX: 33.59 KG/M2 | DIASTOLIC BLOOD PRESSURE: 93 MMHG | SYSTOLIC BLOOD PRESSURE: 148 MMHG

## 2022-05-26 DIAGNOSIS — Z30.09 STERILIZATION CONSULT: Primary | ICD-10-CM

## 2022-05-26 LAB
BILIRUB BLD-MCNC: NEGATIVE MG/DL
CLARITY, POC: CLEAR
COLOR UR: YELLOW
GLUCOSE UR STRIP-MCNC: NEGATIVE MG/DL
KETONES UR QL: NEGATIVE
LEUKOCYTE EST, POC: NEGATIVE
NITRITE UR-MCNC: NEGATIVE MG/ML
PH UR: 5.5 [PH] (ref 5–8)
PROT UR STRIP-MCNC: ABNORMAL MG/DL
RBC # UR STRIP: NEGATIVE /UL
SP GR UR: 1.03 (ref 1–1.03)
UROBILINOGEN UR QL: NORMAL

## 2022-05-26 PROCEDURE — 81002 URINALYSIS NONAUTO W/O SCOPE: CPT | Performed by: NURSE PRACTITIONER

## 2022-05-26 PROCEDURE — 99212 OFFICE O/P EST SF 10 MIN: CPT | Performed by: NURSE PRACTITIONER

## 2022-05-26 NOTE — PROGRESS NOTES
"Chief Complaint  Sterilization    Subjective          Akash North 42 y.o. male presents  to [facility]  History of Present Illness  The patient is a consultation from self, for vasectomy counseling. Prior  surgeries have not been performed. Patient is   and has  2 biological children. He and his spouse have 5 children in the home.They do not wish to have anymore children.    The patient is counseled regarding a no scalpel vasectomy. Key points are that it should be considered irreversible even though it can be reversed, there are risks including failure to achieve sterility, recanalization, bleeding, testicular swelling and/or pain, formation of a sperm granuloma and infection. Limitations of activity post-procedure were discussed and he understands that he is not sterile immediately following the procedure. He will need to bring a semen specimen back in about 6-8 weeks to confirm the abscence of sperm and needs to use contraception until then. Patient understands this is considered an irreversible procedure and wishes have performed. Denies any urological problems or bleeding disorders.            Review of Systems   Constitutional: Negative for chills and fever.   Respiratory: Negative.    Genitourinary: Negative.    Hematological: Does not bruise/bleed easily.      Objective   Vital Signs:   /93   Pulse 101   Temp 98.4 °F (36.9 °C)   Ht 182.9 cm (72\")   Wt 112 kg (248 lb)   BMI 33.63 kg/m²     Physical Exam  Vitals and nursing note reviewed.   Constitutional:       General: He is not in acute distress.     Appearance: Normal appearance. He is well-developed. He is not ill-appearing.      Comments: Ambulates without difficulty   Cardiovascular:      Rate and Rhythm: Normal rate and regular rhythm.   Pulmonary:      Effort: Pulmonary effort is normal.      Breath sounds: Normal breath sounds and air entry.   Genitourinary:     Penis: Circumcised.       Testes: " Normal.         Right: Tenderness not present.         Left: Tenderness not present.      Epididymis:      Right: Normal. No tenderness.      Left: No tenderness.   Musculoskeletal:         General: Normal range of motion.   Skin:     General: Skin is warm and dry.   Neurological:      Mental Status: He is alert and oriented to person, place, and time.      Motor: Motor function is intact.   Psychiatric:         Mood and Affect: Mood normal.         Behavior: Behavior normal. Behavior is cooperative.         Thought Content: Thought content normal.         Judgment: Judgment normal.        Result Review :        POC Urinalysis Dipstick (05/26/2022 09:03)              Assessment and Plan    Diagnoses and all orders for this visit:    1. Sterilization consult (Primary)  -     POC Urinalysis Dipstick    Patient instructed to contact office several days prior to schedule procedure if requesting valium 5 mg x1. Controlled medication will then be sent to his pharmacy. No driving on medication.    Follow Up   Return for vasectomy when scheduled.  Patient was given instructions and counseling regarding his condition or for health maintenance advice. Please see specific information pulled into the AVS if appropriate. Instructions    • The patient is to go immediately home and lie down flat on his back with an ice pack on the scrotum. He may shower in the morning but no immersion in water for 4 days. He is to avoid strenuous activity for 3 days and straddle activity for 3 weeks. He is reminded that he is not yet sterile and must use contraception until we confirm he no longer has sperm in a semen specimen to be brought to the office in 6 weeks. He is to call for problems.  • The patient will schedule a vasectomy if he desires  to proceed.  • Handouts were provided.  • Electronically Identified Patient Education Materials provided.    All risks, benefits and alternatives to vasectomy discussed and understood. Understanding  that this is considered an irreversible procedure. Written consent was obtained. Verbal and written instructions and information were provided. Schedule procedure when patient desires.    Mojgan Thao, APRN

## 2022-05-27 ENCOUNTER — PROCEDURE VISIT (OUTPATIENT)
Dept: UROLOGY | Facility: CLINIC | Age: 42
End: 2022-05-27

## 2022-05-27 DIAGNOSIS — Z30.2 STERILIZATION: Primary | ICD-10-CM

## 2022-05-27 DIAGNOSIS — Z30.09 STERILIZATION CONSULT: ICD-10-CM

## 2022-05-27 PROCEDURE — 55250 REMOVAL OF SPERM DUCT(S): CPT | Performed by: UROLOGY

## 2022-07-19 ENCOUNTER — LAB (OUTPATIENT)
Dept: UROLOGY | Facility: CLINIC | Age: 42
End: 2022-07-19

## 2022-07-19 DIAGNOSIS — Z30.2 STERILIZATION: Primary | ICD-10-CM

## 2022-08-02 ENCOUNTER — LAB (OUTPATIENT)
Dept: UROLOGY | Facility: CLINIC | Age: 42
End: 2022-08-02

## 2022-08-02 DIAGNOSIS — Z30.2 STERILIZATION: Primary | ICD-10-CM

## 2024-09-21 ENCOUNTER — APPOINTMENT (OUTPATIENT)
Dept: CT IMAGING | Facility: HOSPITAL | Age: 44
End: 2024-09-21
Payer: COMMERCIAL

## 2024-09-21 ENCOUNTER — HOSPITAL ENCOUNTER (EMERGENCY)
Facility: HOSPITAL | Age: 44
Discharge: HOME | End: 2024-09-21
Attending: EMERGENCY MEDICINE
Payer: COMMERCIAL

## 2024-09-21 VITALS
SYSTOLIC BLOOD PRESSURE: 139 MMHG | HEART RATE: 99 BPM | BODY MASS INDEX: 33.95 KG/M2 | WEIGHT: 250.66 LBS | HEIGHT: 72 IN | DIASTOLIC BLOOD PRESSURE: 82 MMHG | RESPIRATION RATE: 20 BRPM | OXYGEN SATURATION: 100 % | TEMPERATURE: 98.1 F

## 2024-09-21 DIAGNOSIS — L05.91 PILONIDAL CYST: ICD-10-CM

## 2024-09-21 DIAGNOSIS — K62.89 RECTAL PAIN: Primary | ICD-10-CM

## 2024-09-21 LAB
ALBUMIN SERPL-MCNC: 4.3 G/DL (ref 3.5–5.2)
ALBUMIN/GLOB SERPL: 1.3 G/DL
ALP SERPL-CCNC: 60 U/L (ref 39–117)
ALT SERPL W P-5'-P-CCNC: 50 U/L (ref 1–41)
ANION GAP SERPL CALCULATED.3IONS-SCNC: 12.3 MMOL/L (ref 5–15)
AST SERPL-CCNC: 38 U/L (ref 1–40)
BASOPHILS # BLD AUTO: 0.08 10*3/MM3 (ref 0–0.2)
BASOPHILS NFR BLD AUTO: 0.6 % (ref 0–1.5)
BILIRUB SERPL-MCNC: 0.6 MG/DL (ref 0–1.2)
BUN SERPL-MCNC: 11 MG/DL (ref 6–20)
BUN/CREAT SERPL: 9.2 (ref 7–25)
CALCIUM SPEC-SCNC: 9.4 MG/DL (ref 8.6–10.5)
CHLORIDE SERPL-SCNC: 103 MMOL/L (ref 98–107)
CO2 SERPL-SCNC: 23.7 MMOL/L (ref 22–29)
CREAT SERPL-MCNC: 1.2 MG/DL (ref 0.76–1.27)
DEPRECATED RDW RBC AUTO: 39.9 FL (ref 37–54)
EGFRCR SERPLBLD CKD-EPI 2021: 77.4 ML/MIN/1.73
EOSINOPHIL # BLD AUTO: 0.16 10*3/MM3 (ref 0–0.4)
EOSINOPHIL NFR BLD AUTO: 1.2 % (ref 0.3–6.2)
ERYTHROCYTE [DISTWIDTH] IN BLOOD BY AUTOMATED COUNT: 12.3 % (ref 12.3–15.4)
GLOBULIN UR ELPH-MCNC: 3.2 GM/DL
GLUCOSE SERPL-MCNC: 133 MG/DL (ref 65–99)
HCT VFR BLD AUTO: 44.4 % (ref 37.5–51)
HGB BLD-MCNC: 15.8 G/DL (ref 13–17.7)
HOLD SPECIMEN: NORMAL
IMM GRANULOCYTES # BLD AUTO: 0.12 10*3/MM3 (ref 0–0.05)
IMM GRANULOCYTES NFR BLD AUTO: 0.9 % (ref 0–0.5)
LYMPHOCYTES # BLD AUTO: 2.65 10*3/MM3 (ref 0.7–3.1)
LYMPHOCYTES NFR BLD AUTO: 20.4 % (ref 19.6–45.3)
MCH RBC QN AUTO: 31.5 PG (ref 26.6–33)
MCHC RBC AUTO-ENTMCNC: 35.6 G/DL (ref 31.5–35.7)
MCV RBC AUTO: 88.6 FL (ref 79–97)
MONOCYTES # BLD AUTO: 0.97 10*3/MM3 (ref 0.1–0.9)
MONOCYTES NFR BLD AUTO: 7.5 % (ref 5–12)
NEUTROPHILS NFR BLD AUTO: 69.4 % (ref 42.7–76)
NEUTROPHILS NFR BLD AUTO: 8.98 10*3/MM3 (ref 1.7–7)
NRBC BLD AUTO-RTO: 0 /100 WBC (ref 0–0.2)
PLATELET # BLD AUTO: 246 10*3/MM3 (ref 140–450)
PMV BLD AUTO: 9.4 FL (ref 6–12)
POTASSIUM SERPL-SCNC: 4.3 MMOL/L (ref 3.5–5.2)
PROT SERPL-MCNC: 7.5 G/DL (ref 6–8.5)
RBC # BLD AUTO: 5.01 10*6/MM3 (ref 4.14–5.8)
SODIUM SERPL-SCNC: 139 MMOL/L (ref 136–145)
WBC NRBC COR # BLD AUTO: 12.96 10*3/MM3 (ref 3.4–10.8)
WHOLE BLOOD HOLD COAG: NORMAL

## 2024-09-21 PROCEDURE — 25010000002 MORPHINE PER 10 MG: Performed by: EMERGENCY MEDICINE

## 2024-09-21 PROCEDURE — 85025 COMPLETE CBC W/AUTO DIFF WBC: CPT | Performed by: EMERGENCY MEDICINE

## 2024-09-21 PROCEDURE — 96361 HYDRATE IV INFUSION ADD-ON: CPT

## 2024-09-21 PROCEDURE — 25010000002 KETOROLAC TROMETHAMINE PER 15 MG: Performed by: EMERGENCY MEDICINE

## 2024-09-21 PROCEDURE — 96374 THER/PROPH/DIAG INJ IV PUSH: CPT

## 2024-09-21 PROCEDURE — 80053 COMPREHEN METABOLIC PANEL: CPT | Performed by: EMERGENCY MEDICINE

## 2024-09-21 PROCEDURE — 99285 EMERGENCY DEPT VISIT HI MDM: CPT

## 2024-09-21 PROCEDURE — 74177 CT ABD & PELVIS W/CONTRAST: CPT

## 2024-09-21 PROCEDURE — 25810000003 SODIUM CHLORIDE 0.9 % SOLUTION: Performed by: EMERGENCY MEDICINE

## 2024-09-21 PROCEDURE — 96375 TX/PRO/DX INJ NEW DRUG ADDON: CPT

## 2024-09-21 PROCEDURE — 25510000001 IOPAMIDOL PER 1 ML: Performed by: EMERGENCY MEDICINE

## 2024-09-21 RX ORDER — DESVENLAFAXINE 50 MG/1
50 TABLET, FILM COATED, EXTENDED RELEASE ORAL DAILY
COMMUNITY

## 2024-09-21 RX ORDER — KETOROLAC TROMETHAMINE 15 MG/ML
15 INJECTION, SOLUTION INTRAMUSCULAR; INTRAVENOUS ONCE
Status: COMPLETED | OUTPATIENT
Start: 2024-09-21 | End: 2024-09-21

## 2024-09-21 RX ORDER — IOPAMIDOL 755 MG/ML
100 INJECTION, SOLUTION INTRAVASCULAR
Status: COMPLETED | OUTPATIENT
Start: 2024-09-21 | End: 2024-09-21

## 2024-09-21 RX ADMIN — IOPAMIDOL 100 ML: 755 INJECTION, SOLUTION INTRAVENOUS at 22:00

## 2024-09-21 RX ADMIN — MORPHINE SULFATE 4 MG: 4 INJECTION, SOLUTION INTRAMUSCULAR; INTRAVENOUS at 20:21

## 2024-09-21 RX ADMIN — SODIUM CHLORIDE 1000 ML: 9 INJECTION, SOLUTION INTRAVENOUS at 20:21

## 2024-09-21 RX ADMIN — KETOROLAC TROMETHAMINE 15 MG: 15 INJECTION, SOLUTION INTRAMUSCULAR; INTRAVENOUS at 22:21

## 2024-09-22 RX ORDER — LIDOCAINE 50 MG/G
1 OINTMENT TOPICAL
Qty: 50 G | Refills: 0 | Status: SHIPPED | OUTPATIENT
Start: 2024-09-22